# Patient Record
Sex: FEMALE | Race: WHITE | Employment: UNEMPLOYED | URBAN - METROPOLITAN AREA
[De-identification: names, ages, dates, MRNs, and addresses within clinical notes are randomized per-mention and may not be internally consistent; named-entity substitution may affect disease eponyms.]

---

## 2017-08-22 ENCOUNTER — APPOINTMENT (OUTPATIENT)
Dept: GENERAL RADIOLOGY | Age: 27
End: 2017-08-22
Attending: NURSE PRACTITIONER
Payer: MEDICAID

## 2017-08-22 ENCOUNTER — HOSPITAL ENCOUNTER (OUTPATIENT)
Age: 27
Discharge: HOME | End: 2017-08-22
Payer: MEDICAID

## 2017-08-22 VITALS
DIASTOLIC BLOOD PRESSURE: 79 MMHG | WEIGHT: 155 LBS | RESPIRATION RATE: 18 BRPM | OXYGEN SATURATION: 100 % | HEIGHT: 68 IN | HEART RATE: 124 BPM | TEMPERATURE: 98 F | BODY MASS INDEX: 23.49 KG/M2 | SYSTOLIC BLOOD PRESSURE: 116 MMHG

## 2017-08-22 DIAGNOSIS — S39.012A STRAIN OF LUMBAR REGION, INITIAL ENCOUNTER: Primary | ICD-10-CM

## 2017-08-22 PROCEDURE — 99203 OFFICE O/P NEW LOW 30 MIN: CPT

## 2017-08-22 PROCEDURE — 72100 X-RAY EXAM L-S SPINE 2/3 VWS: CPT | Performed by: NURSE PRACTITIONER

## 2017-08-22 PROCEDURE — 99204 OFFICE O/P NEW MOD 45 MIN: CPT

## 2017-08-22 RX ORDER — CYCLOBENZAPRINE HCL 10 MG
10 TABLET ORAL 3 TIMES DAILY PRN
COMMUNITY

## 2017-08-22 RX ORDER — ONDANSETRON 4 MG/1
4 TABLET, ORALLY DISINTEGRATING ORAL ONCE
Status: COMPLETED | OUTPATIENT
Start: 2017-08-22 | End: 2017-08-22

## 2017-08-22 RX ORDER — ACETAMINOPHEN AND CODEINE PHOSPHATE 300; 30 MG/1; MG/1
1-2 TABLET ORAL EVERY 4 HOURS PRN
Qty: 20 TABLET | Refills: 0 | Status: SHIPPED | OUTPATIENT
Start: 2017-08-22 | End: 2017-08-29

## 2017-08-22 RX ORDER — HYDROCODONE BITARTRATE AND ACETAMINOPHEN 5; 325 MG/1; MG/1
1 TABLET ORAL ONCE
Status: COMPLETED | OUTPATIENT
Start: 2017-08-22 | End: 2017-08-22

## 2017-08-22 RX ORDER — CYCLOBENZAPRINE HCL 10 MG
10 TABLET ORAL 3 TIMES DAILY PRN
Qty: 20 TABLET | Refills: 0 | Status: SHIPPED | OUTPATIENT
Start: 2017-08-22 | End: 2017-08-29

## 2017-08-22 NOTE — ED PROVIDER NOTES
Patient presents with:  Back Pain (musculoskeletal)      HPI:     Sheldon Cuellar is a 33year old female who presents for evaluation and management of a chief complaint of  back pain. Patient reports she has been preparing to move.   Lifting heavy boxes as cyanosis, clubbing or edema  GI: soft, non-tender, normal bowel sounds    MDM/Assessment/Plan:   Orders for this encounter:    Xray, norco and re-evaluate.      1135- Pt now c/o nausea and had 1 episode of vomiting, Zofran ODT given     Differential diagnos Dispense:  20 tablet          Refill:  0    Labs performed this visit:  No results found for this or any previous visit (from the past 10 hour(s)).     Diagnosis:    ICD-10-CM    1. Strain of lumbar region, initial encounter S39.012A        Testing / [facility] [employer]

## 2017-08-22 NOTE — ED INITIAL ASSESSMENT (HPI)
Pt with pain lumbar back. States she's had pain for past couple of days related to lifting as she is getting ready to move. States pain starts mid lumbar spine and radiates to lower back and down both legs posteriorly.  States she has a history of bulging d

## 2017-08-26 ENCOUNTER — HOSPITAL ENCOUNTER (OUTPATIENT)
Age: 27
Discharge: HOME | End: 2017-08-26
Attending: EMERGENCY MEDICINE
Payer: MEDICAID

## 2017-08-26 VITALS
TEMPERATURE: 98 F | RESPIRATION RATE: 16 BRPM | WEIGHT: 155 LBS | DIASTOLIC BLOOD PRESSURE: 83 MMHG | HEART RATE: 74 BPM | BODY MASS INDEX: 23.49 KG/M2 | HEIGHT: 68 IN | SYSTOLIC BLOOD PRESSURE: 125 MMHG | OXYGEN SATURATION: 98 %

## 2017-08-26 DIAGNOSIS — K02.9 DENTAL CARIES: Primary | ICD-10-CM

## 2017-08-26 PROCEDURE — 99214 OFFICE O/P EST MOD 30 MIN: CPT

## 2017-08-26 PROCEDURE — 99213 OFFICE O/P EST LOW 20 MIN: CPT

## 2017-08-26 RX ORDER — CLINDAMYCIN HYDROCHLORIDE 150 MG/1
150 CAPSULE ORAL 3 TIMES DAILY
Qty: 15 CAPSULE | Refills: 0 | Status: SHIPPED | OUTPATIENT
Start: 2017-08-26 | End: 2017-08-31

## 2017-08-26 NOTE — ED INITIAL ASSESSMENT (HPI)
Here few days ago with back pain-given Rnx pain meds and muscle relax. 24 hours ago lower left molars cracked and \"strange\"taste resulted moving to [place] on Monday no dentist multiple dental issues noted lower left area.

## 2017-08-26 NOTE — ED PROVIDER NOTES
Patient Seen in: [facility] In [address] [place] [address]    History   Patient presents with:  Dental Problem (dental)    Stated Complaint: Tooth Pain    HPI    Patient is a 60-year-old female who presents to the urgent care with a chief complaint de ED Course   Labs Reviewed - No data to display    ============================================================  ED Course  ------------------------------------------------------------  MDM     Review of [place] prescription monitoring program site rev

## 2017-08-26 NOTE — ED NOTES
Start po meds use t#3 you got here 2 days ago or motrin for pain.  Follow up with dentist in [facility] Monday